# Patient Record
Sex: MALE | Race: WHITE | NOT HISPANIC OR LATINO | URBAN - METROPOLITAN AREA
[De-identification: names, ages, dates, MRNs, and addresses within clinical notes are randomized per-mention and may not be internally consistent; named-entity substitution may affect disease eponyms.]

---

## 2019-02-06 ENCOUNTER — EMERGENCY (EMERGENCY)
Facility: HOSPITAL | Age: 29
LOS: 1 days | Discharge: HOME | End: 2019-02-06
Attending: EMERGENCY MEDICINE | Admitting: EMERGENCY MEDICINE
Payer: OTHER MISCELLANEOUS

## 2019-02-06 VITALS
TEMPERATURE: 98 F | SYSTOLIC BLOOD PRESSURE: 134 MMHG | DIASTOLIC BLOOD PRESSURE: 78 MMHG | OXYGEN SATURATION: 98 % | RESPIRATION RATE: 18 BRPM | HEART RATE: 88 BPM

## 2019-02-06 VITALS
DIASTOLIC BLOOD PRESSURE: 76 MMHG | OXYGEN SATURATION: 95 % | RESPIRATION RATE: 18 BRPM | SYSTOLIC BLOOD PRESSURE: 149 MMHG | TEMPERATURE: 99 F | HEART RATE: 116 BPM

## 2019-02-06 DIAGNOSIS — S63.502A UNSPECIFIED SPRAIN OF LEFT WRIST, INITIAL ENCOUNTER: ICD-10-CM
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
## 2019-02-06 DIAGNOSIS — M25.561 PAIN IN RIGHT KNEE: ICD-10-CM

## 2019-02-06 DIAGNOSIS — S80.212A ABRASION, LEFT KNEE, INITIAL ENCOUNTER: ICD-10-CM

## 2019-02-06 DIAGNOSIS — S80.211A ABRASION, RIGHT KNEE, INITIAL ENCOUNTER: ICD-10-CM
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
## 2019-02-06 PROCEDURE — 73110 X-RAY EXAM OF WRIST: CPT

## 2019-02-06 PROCEDURE — 99283 EMERGENCY DEPT VISIT LOW MDM: CPT

## 2019-02-06 PROCEDURE — 73110 X-RAY EXAM OF WRIST: CPT | Mod: 26,LT

## 2019-02-06 RX ORDER — IBUPROFEN 200 MG
600 TABLET ORAL ONCE
Qty: 0 | Refills: 0 | Status: COMPLETED | OUTPATIENT
Start: 2019-02-06 | End: 2019-02-06

## 2019-02-06 RX ADMIN — Medication 600 MILLIGRAM(S): at 19:54

## 2019-02-06 RX ADMIN — Medication 600 MILLIGRAM(S): at 20:52

## 2019-02-06 NOTE — ED PROVIDER NOTE - CARE PLAN
Principal Discharge DX:	Sprain of left wrist, initial encounter  Secondary Diagnosis:	Abrasions of multiple sites

## 2019-02-06 NOTE — ED PROVIDER NOTE - NSFOLLOWUPINSTRUCTIONS_ED_ALL_ED_FT
Wrist Sprain, Adult  A wrist sprain is a stretch or tear in the strong, fibrous tissues (ligaments) that connect your wrist bones. There are three types of wrist sprains:    Grade 1. In this type of sprain, the ligament is stretched more than normal.  Grade 2. In this type of sprain, the ligament is partially torn. You may be able to move your wrist, but not very much.  Grade 3. In this type of sprain, the ligament or muscle is completely torn. You may find it difficult or extremely painful to move your wrist even a little.    What are the causes?  A wrist sprain can be caused by using the wrist too much during sports, exercise, or at work. It can also happen with a fall or during an accident.    What increases the risk?  This condition is more likely to occur in people:    With a previous wrist or arm injury.  With poor wrist strength and flexibility.  Who play contact sports, such as football or soccer.  Who play sports that may result in a fall, such as skateboarding, biking, skiing, or snowboarding.  Who do not exercise regularly.  Who use exercise equipment that does not fit well.    What are the signs or symptoms?  Symptoms of this condition include:    Pain in the wrist, arm, or hand.  Swelling or bruised skin near the wrist, hand, or arm. The skin may look yellow or kind of blue.  Stiffness or trouble moving the hand.  Hearing a pop or feeling a tear at the time of the injury.  A warm feeling in the skin around the wrist.    How is this diagnosed?  This condition is diagnosed with a physical exam. Sometimes an X-ray is taken to make sure a bone did not break. If your health care provider thinks that you tore a ligament, he or she may order an MRI of your wrist.    How is this treated?  This condition is treated by resting and applying ice to your wrist. Additional treatment may include:    Medicine for pain and inflammation.  Exercises to strengthen and stretch your wrist.  Surgery. This may be done if the ligament is completely torn    Please follow up with your primary care doctor.     Abrasion  An abrasion is a cut or scrape on the outer surface of your skin. An abrasion does not extend through all of the layers of your skin. It is important to care for your abrasion properly to prevent infection.    What are the causes?  Most abrasions are caused by falling on or gliding across the ground or another surface. When your skin rubs on something, the outer and inner layer of skin rubs off.    What are the signs or symptoms?  A cut or scrape is the main symptom of this condition. The scrape may be bleeding, or it may appear red or pink. If there was an associated fall, there may be an underlying bruise.    How is this diagnosed?  An abrasion is diagnosed with a physical exam.    How is this treated?  Treatment for this condition depends on how large and deep the abrasion is. Usually, your abrasion will be cleaned with water and mild soap. This removes any dirt or debris that may be stuck. An antibiotic ointment may be applied to the abrasion to help prevent infection. A bandage (dressing) may be placed on the abrasion to keep it clean.    You may also need a tetanus shot.    Follow these instructions at home:  Medicines     Take or apply medicines only as directed by your health care provider.  If you were prescribed an antibiotic ointment, finish all of it even if you start to feel better.  Wound care     Clean the wound with mild soap and water 2–3 times per day or as directed by your health care provider. Pat your wound dry with a clean towel. Do not rub it.  There are many different ways to close and cover a wound. Follow instructions from your health care provider about:    Wound care.  Dressing changes and removal.    Check your wound every day for signs of infection. Watch for:    Redness, swelling, or pain.  Fluid, blood, or pus.    General instructions     Image   Keep the dressing dry as directed by your health care provider. Do not take baths, swim, use a hot tub, or do anything that would put your wound underwater until your health care provider approves.  If there is swelling, raise (elevate) the injured area above the level of your heart while you are sitting or lying down.  Keep all follow-up visits as directed by your health care provider. This is important.  Contact a health care provider if:  You received a tetanus shot and you have swelling, severe pain, redness, or bleeding at the injection site.  Your pain is not controlled with medicine.  You have increased redness, swelling, or pain at the site of your wound.  Get help right away if:  You have a red streak going away from your wound.  You have a fever.  You have fluid, blood, or pus coming from your wound.  You notice a bad smell coming from your wound or your dressing.

## 2019-02-06 NOTE — ED ADULT NURSE NOTE - OBJECTIVE STATEMENT
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 abrasions noted to b/l knees and left palm. c.o pain to right toe, b/l knees and left hand. denies any numbness/tingling to lower extremities, denies any head injuries. abrasions cleaned at this time. states he is UTD with tetanus vaccine. steady gait noted

## 2019-02-06 NOTE — ED PROVIDER NOTE - OBJECTIVE STATEMENT
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 pt is a , working when this happened. abrasions noted to b/l knees and left palm. c.o pain to right toe, b/l knees and left hand, but states pain decreasing in knees, ambulating without issue. concerned about L wrist pain. Denies decrease in ROM in any extremity or joint. denies any numbness/tingling to lower extremities, denies any head injuries. abrasions cleaned at this time. states he is UTD with tetanus vaccine.

## 2019-02-06 NOTE — ED PROVIDER NOTE - PHYSICAL EXAMINATION
CONSTITUTIONAL: Well-appearing; well-nourished; in no apparent distress.   HEAD: Normocephalic; atraumatic.   EYES:  conjunctiva and sclera clear  ENT: normal nose; no rhinorrhea; normal pharynx with no erythema or lesions.   NECK: Supple; non-tender;   CARDIOVASCULAR: Normal S1, S2; no murmurs, rubs, or gallops. Regular rate and rhythm.   RESPIRATORY: Breathing easily; breath sounds clear and equal bilaterally; no wheezes, rhonchi, or rales.  GI: Soft; non-distended; non-tender; no palpable organomegaly.   EXT: No cyanosis or edema; N/V intact. Bilateral superficial abrasions to both knees, R>L, no lacerations. no active bleeding. +mild bruising to palm. no tenderness to palpation over scaphoid or with axial loading. no bony tenderness over knees or wrist.  SKIN: Normal for age and race; warm; dry; good turgor; no apparent lesions or rash.   NEURO: A & O x 3; face symmetric; grossly unremarkable.   PSYCHOLOGICAL: The patient’s mood and manner are appropriate.

## 2022-02-03 NOTE — ED PROVIDER NOTE - MEDICAL DECISION MAKING DETAILS
18-May-2022
here w/ fall while on duty. abrasions dressed. xray with no fx of wrist. DC home in NAD with strict return precautions given.

## 2023-01-05 NOTE — ED ADULT TRIAGE NOTE - ARRIVAL FROM
subway Cimzia Pregnancy And Lactation Text: This medication crosses the placenta but can be considered safe in certain situations. Cimzia may be excreted in breast milk.